# Patient Record
Sex: FEMALE | Race: WHITE | NOT HISPANIC OR LATINO | Employment: UNEMPLOYED | URBAN - METROPOLITAN AREA
[De-identification: names, ages, dates, MRNs, and addresses within clinical notes are randomized per-mention and may not be internally consistent; named-entity substitution may affect disease eponyms.]

---

## 2021-07-14 ENCOUNTER — TELEPHONE (OUTPATIENT)
Dept: SCHEDULING | Facility: IMAGING CENTER | Age: 36
End: 2021-07-14

## 2021-07-15 ENCOUNTER — TELEPHONE (OUTPATIENT)
Dept: MEDICAL GROUP | Facility: MEDICAL CENTER | Age: 36
End: 2021-07-15

## 2021-07-15 ENCOUNTER — OFFICE VISIT (OUTPATIENT)
Dept: MEDICAL GROUP | Facility: MEDICAL CENTER | Age: 36
End: 2021-07-15
Attending: FAMILY MEDICINE
Payer: MEDICAID

## 2021-07-15 VITALS
DIASTOLIC BLOOD PRESSURE: 78 MMHG | BODY MASS INDEX: 47.09 KG/M2 | HEIGHT: 66 IN | WEIGHT: 293 LBS | SYSTOLIC BLOOD PRESSURE: 122 MMHG | OXYGEN SATURATION: 97 % | HEART RATE: 83 BPM | TEMPERATURE: 99 F | RESPIRATION RATE: 22 BRPM

## 2021-07-15 DIAGNOSIS — I89.0 LYMPHEDEMA: ICD-10-CM

## 2021-07-15 DIAGNOSIS — D64.9 ANEMIA, UNSPECIFIED TYPE: ICD-10-CM

## 2021-07-15 DIAGNOSIS — J45.909 UNCOMPLICATED ASTHMA, UNSPECIFIED ASTHMA SEVERITY, UNSPECIFIED WHETHER PERSISTENT: ICD-10-CM

## 2021-07-15 DIAGNOSIS — I27.82 CHRONIC PULMONARY EMBOLISM, UNSPECIFIED PULMONARY EMBOLISM TYPE, UNSPECIFIED WHETHER ACUTE COR PULMONALE PRESENT (HCC): ICD-10-CM

## 2021-07-15 DIAGNOSIS — E04.1 THYROID NODULE: ICD-10-CM

## 2021-07-15 PROBLEM — G47.39 OTHER SLEEP APNEA: Status: ACTIVE | Noted: 2021-07-15

## 2021-07-15 PROBLEM — F43.10 PTSD (POST-TRAUMATIC STRESS DISORDER): Status: ACTIVE | Noted: 2021-07-15

## 2021-07-15 PROBLEM — K21.9 GASTRO-ESOPHAGEAL REFLUX DISEASE WITHOUT ESOPHAGITIS: Status: ACTIVE | Noted: 2021-02-11

## 2021-07-15 PROBLEM — M89.9 BONE DISORDER: Status: ACTIVE | Noted: 2021-07-15

## 2021-07-15 PROBLEM — K76.0 NAFLD (NONALCOHOLIC FATTY LIVER DISEASE): Status: ACTIVE | Noted: 2021-07-15

## 2021-07-15 PROBLEM — E78.1 HYPERTRIGLYCERIDEMIA: Status: ACTIVE | Noted: 2021-04-20

## 2021-07-15 PROBLEM — G43.909 MIGRAINE: Status: ACTIVE | Noted: 2021-07-15

## 2021-07-15 PROBLEM — M48.00 SPINAL STENOSIS: Status: ACTIVE | Noted: 2021-07-15

## 2021-07-15 PROBLEM — F41.1 GAD (GENERALIZED ANXIETY DISORDER): Status: ACTIVE | Noted: 2021-07-15

## 2021-07-15 PROBLEM — N85.00 ENDOMETRIAL HYPERPLASIA: Status: ACTIVE | Noted: 2021-07-15

## 2021-07-15 PROCEDURE — 99213 OFFICE O/P EST LOW 20 MIN: CPT | Performed by: FAMILY MEDICINE

## 2021-07-15 PROCEDURE — 99204 OFFICE O/P NEW MOD 45 MIN: CPT | Performed by: FAMILY MEDICINE

## 2021-07-15 RX ORDER — ALBUTEROL SULFATE 90 UG/1
2 AEROSOL, METERED RESPIRATORY (INHALATION) EVERY 6 HOURS PRN
Qty: 18 G | Refills: 5 | Status: SHIPPED | OUTPATIENT
Start: 2021-07-15 | End: 2021-07-16

## 2021-07-15 RX ORDER — DABIGATRAN ETEXILATE 150 MG/1
CAPSULE ORAL
COMMUNITY
Start: 2021-02-11 | End: 2021-07-15 | Stop reason: SDUPTHER

## 2021-07-15 RX ORDER — MONTELUKAST SODIUM 10 MG/1
TABLET ORAL
COMMUNITY
End: 2023-01-05 | Stop reason: SDUPTHER

## 2021-07-15 RX ORDER — ATORVASTATIN CALCIUM 20 MG/1
20 TABLET, FILM COATED ORAL
COMMUNITY
Start: 2021-04-20 | End: 2021-07-15

## 2021-07-15 RX ORDER — EPINEPHRINE 0.3 MG/.3ML
INJECTION SUBCUTANEOUS
COMMUNITY

## 2021-07-15 RX ORDER — DABIGATRAN ETEXILATE 150 MG/1
150 CAPSULE ORAL 2 TIMES DAILY
Qty: 60 CAPSULE | Refills: 3 | Status: SHIPPED | OUTPATIENT
Start: 2021-07-15 | End: 2023-01-05

## 2021-07-15 RX ORDER — TOPIRAMATE 25 MG/1
TABLET ORAL
COMMUNITY
End: 2023-01-05 | Stop reason: SDUPTHER

## 2021-07-15 RX ORDER — TRIAMCINOLONE ACETONIDE 1 MG/G
CREAM TOPICAL
COMMUNITY
End: 2023-01-05

## 2021-07-15 NOTE — LETTER
[facility]  Susanna Lowery M.D.  [address]  [place] [address]  Fax: [phone]   Authorization for Release/Disclosure of   Protected Health Information   Name: EFE OLIVA : 1985 SSN: [id number]   Address: [address] [place] [address] [address]  [place] [address] Phone:    [phone] (home)    I authorize the entity listed below to release/disclose the PHI below to:   [facility]/Susanna Lowery M.D. and Susanna Lowery M.D.   Provider or Entity Name:  [facility]   Address   [facility], [facility], [place], [place] Phone:      Fax:     Reason for request: continuity of care   Information to be released:    [  ] LAST COLONOSCOPY,  including any PATH REPORT and follow-up  [  ] LAST FIT/COLOGUARD RESULT [  ] LAST DEXA  [  ] LAST MAMMOGRAM  [  ] LAST PAP  [  ] LAST LABS [  ] RETINA EXAM REPORT  [  ] IMMUNIZATION RECORDS  [ X ] Release all info      [  ] Check here and initial the line next to each item to release ALL health information INCLUDING  _____ Care and treatment for drug and / or alcohol abuse  _____ HIV testing, infection status, or AIDS  _____ Genetic Testing    DATES OF SERVICE OR TIME PERIOD TO BE DISCLOSED: _____________  I understand and acknowledge that:  * This Authorization may be revoked at any time by you in writing, except if your health information has already been used or disclosed.  * Your health information that will be used or disclosed as a result of you signing this authorization could be re-disclosed by the recipient. If this occurs, your re-disclosed health information may no longer be protected by State or Federal laws.  * You may refuse to sign this Authorization. Your refusal will not affect your ability to obtain treatment.  * This Authorization becomes effective upon signing and will  on (date) __________.      If no date is indicated, this Authorization will  one (1) year from the signature date.    Name: Efe Oliva    Signature:   Date:     7/15/2021         PLEASE FAX REQUESTED RECORDS BACK TO: [phone]

## 2021-07-15 NOTE — ASSESSMENT & PLAN NOTE
Chronic chest pain that patient states is due to chronic PE. She's been on AC since 2017, previously on warfarin, was placed on pradaxa due to covid so that she didn't have to keep going in to clinic. Not sure about recent imaging. All of her workup has been done in [place]. Not sure about clotting disorders

## 2021-07-15 NOTE — LETTER
[facility]  Susanna Lowery M.D.  [address]  [place] [address]  Fax: [phone]   Authorization for Release/Disclosure of   Protected Health Information   Name: EFE OLIVA : 1985 SSN: [id number]   Address: [address] [place] [address] [address]  [place] [address] Phone:    [phone] (home)    I authorize the entity listed below to release/disclose the PHI below to:   [facility]/Susanna Lowery M.D. and Susanna Lowery M.D.   Provider or Entity Name:  [facility]   Address   [facility], [facility], [place], [place] Phone:      Fax:     Reason for request: continuity of care   Information to be released:    [  ] LAST COLONOSCOPY,  including any PATH REPORT and follow-up  [  ] LAST FIT/COLOGUARD RESULT [  ] LAST DEXA  [  ] LAST MAMMOGRAM  [  ] LAST PAP  [  ] LAST LABS [  ] RETINA EXAM REPORT  [  ] IMMUNIZATION RECORDS  [ X ] Release all info      [  ] Check here and initial the line next to each item to release ALL health information INCLUDING  _____ Care and treatment for drug and / or alcohol abuse  _____ HIV testing, infection status, or AIDS  _____ Genetic Testing    DATES OF SERVICE OR TIME PERIOD TO BE DISCLOSED: _____________  I understand and acknowledge that:  * This Authorization may be revoked at any time by you in writing, except if your health information has already been used or disclosed.  * Your health information that will be used or disclosed as a result of you signing this authorization could be re-disclosed by the recipient. If this occurs, your re-disclosed health information may no longer be protected by State or Federal laws.  * You may refuse to sign this Authorization. Your refusal will not affect your ability to obtain treatment.  * This Authorization becomes effective upon signing and will  on (date) __________.      If no date is indicated, this Authorization will  one (1) year from the signature date.    Name: Efe Oliva    Signature:   Date:     7/15/2021            PLEASE FAX REQUESTED RECORDS BACK TO: [phone]

## 2021-07-15 NOTE — LETTER
[facility]  Susanna Lowery M.D.  [address]  [place] [address]  Fax: [phone]   Authorization for Release/Disclosure of   Protected Health Information   Name: TARI MCGINNIS : 1985 SSN: [id number]   Address: [address] [place] [address] [address]  [place] [address] Phone:    [phone] (home)    I authorize the entity listed below to release/disclose the PHI below to:   [facility]/Susanna Lowery M.D. and Susanna Lowery M.D.   Provider or Entity Name:  Dr. Zach Montes - [facility]   Address   City, State, [place], [place] Phone:      Fax:     Reason for request: continuity of care   Information to be released:    [  ] LAST COLONOSCOPY,  including any PATH REPORT and follow-up  [  ] LAST FIT/COLOGUARD RESULT [  ] LAST DEXA  [  ] LAST MAMMOGRAM  [  ] LAST PAP  [  ] LAST LABS [  ] RETINA EXAM REPORT  [  ] IMMUNIZATION RECORDS  [ X ] Release all info      [  ] Check here and initial the line next to each item to release ALL health information INCLUDING  _____ Care and treatment for drug and / or alcohol abuse  _____ HIV testing, infection status, or AIDS  _____ Genetic Testing    DATES OF SERVICE OR TIME PERIOD TO BE DISCLOSED: _____________  I understand and acknowledge that:  * This Authorization may be revoked at any time by you in writing, except if your health information has already been used or disclosed.  * Your health information that will be used or disclosed as a result of you signing this authorization could be re-disclosed by the recipient. If this occurs, your re-disclosed health information may no longer be protected by State or Federal laws.  * You may refuse to sign this Authorization. Your refusal will not affect your ability to obtain treatment.  * This Authorization becomes effective upon signing and will  on (date) __________.      If no date is indicated, this Authorization will  one (1) year from the signature date.    Name: Tari Mcginnis    Signature:    Date:     7/15/2021       PLEASE FAX REQUESTED RECORDS BACK TO: [phone]

## 2021-07-15 NOTE — PROGRESS NOTES
"Subjective:     CC:    Chief Complaint   Patient presents with   • Establish Care     med refills, note to have therapy animal       HISTORY OF THE PRESENT ILLNESS: Patient is a 35 y.o. female. This pleasant patient is here today to establish care and discuss the following issues.   His/her prior PCP was in [place] and in [facility]. [place], was seeing Dr. Montes - Family medicine     Specialists  - Endocrinologist - weight loss - [facility]  - Hematology - PEs  - ENT - vocal cord dysunfction  - Sleep/pulm   - Ob/GYN - endometrial hyperplasia        Other sleep apnea  Pt states she couldn't tolerate CPAP  Last sleep study > 5-6 years ago    BMI 70 and over, adult (HCC)  Pt states that she is seeing nutrition, states that she has lot some weight already     Asthma  Albuterol use - maybe about once a month     Chronic pulmonary embolism (HCC)  Chronic chest pain that patient states is due to chronic PE. She's been on AC since 2017, previously on warfarin, was placed on pradaxa due to covid so that she didn't have to keep going in to clinic. Not sure about recent imaging. All of her workup has been done in [place]. Not sure about clotting disorders    Hypertriglyceridemia  States that \"my cholesterol is always zero\" but she's not sure about triglycerides.       Allergies: Bee, Metformin, Pcn [penicillins], and Tree nuts food allergy    Current Outpatient Medications Ordered in Epic   Medication Sig Dispense Refill   • dabigatran (PRADAXA) 150 MG Cap capsule Take 1 capsule by mouth 2 times a day. 60 capsule 3   • albuterol (VENTOLIN HFA) 108 (90 Base) MCG/ACT Aero Soln inhalation aerosol Inhale 2 Puffs every 6 hours as needed for Shortness of Breath. 18 g 5   • topiramate (TOPAMAX) 25 MG Tab TOPIRAMATE 25 MG TABS     • Omeprazole 20 MG Tablet Delayed Release Dispersible OMEPRAZOLE 20 MG TBDD     • EPINEPHrine (EPIPEN 2-FABIO) 0.3 MG/0.3ML Solution Auto-injector solution for " "injection EPIPEN 2-FABIO 0.3 MG/0.3ML SOAJ     • montelukast (SINGULAIR) 10 MG Tab MONTELUKAST SODIUM 10 MG TABS     • triamcinolone acetonide (KENALOG) 0.1 % Cream TRIAMCINOLONE ACETONIDE 0.1 % CREA       No current Epic-ordered facility-administered medications on file.       History reviewed. No pertinent past medical history.    History reviewed. No pertinent surgical history.    Social History     Tobacco Use   • Smoking status: Never Smoker   Vaping Use   • Vaping Use: Never used   Substance Use Topics   • Alcohol use: Not Currently     Comment: very rare   • Drug use: Yes     Types: Marijuana     Comment: edibles for pain       Social History     Social History Narrative   • Not on file       History reviewed. No pertinent family history.    ROS:   Gen: no fevers/chills  Pulm: chronic sob, hx asthma  CV: chronic chest pain  GI: reported hx of IBS      Objective:     Exam: /78 (BP Location: Left arm, Patient Position: Sitting, BP Cuff Size: Adult long)   Pulse 83   Temp 37.2 °C (99 °F) (Temporal)   Resp (!) 22   Ht 1.676 m (5' 6\")   Wt (!) 209 kg (461 lb 11.2 oz)   SpO2 97%  Body mass index is 74.52 kg/m².    General: Normal appearing. No distress.  Head: normocephalic  Neck: Supple. Thyroid is not enlarged.  Pulmonary: Clear to ausculation. Decreased sounds due to body habitys. Normal effort. No rales, ronchi, or wheezing.  Cardiovascular: decreased sounds due to body habitus. Regular rate and rhythm without murmur.   Abdomen: Soft, nontender, nondistended. Obese abdomen  Neurologic: no facial droop, gait normal  Lymph: No cervical or supraclavicular lymph nodes are palpable  Skin: Warm and dry.  No obvious lesions.  Musculoskeletal: Uses scooter for mobility. No extremity cyanosis, clubbing, or edema.  Psych: Normal mood and affect. Alert and oriented x3. Judgment and insight is normal.       Labs:   none    Assessment & Plan:   35 y.o. female with the following -    1. Uncomplicated asthma, " "unspecified asthma severity, unspecified whether persistent  - albuterol (VENTOLIN HFA) 108 (90 Base) MCG/ACT Aero Soln inhalation aerosol; Inhale 2 Puffs every 6 hours as needed for Shortness of Breath.  Dispense: 18 g; Refill: 5  Intermittent use of albuterol - will provide rx and pt to let me know how often she is using it.     2. Chronic pulmonary embolism, unspecified pulmonary embolism type, unspecified whether acute cor pulmonale present (HCC)  - dabigatran (PRADAXA) 150 MG Cap capsule; Take 1 capsule by mouth 2 times a day.  Dispense: 60 capsule; Refill: 3  - REFERRAL TO VASCULAR MEDICINE  Records requested on history of chronic PE. Pradaxa is reordered, may need to switch based on formulary preference. Sending to vascular medicine for management and to determine if she still needs to be on this. Not sure if she is meant to be on this indefinitely, pt is not sure of hypercoag workup in the past.    3. Lymphedema  - REFERRAL TO LYMPHEDEMA-PHYSICAL THERAPY  - Comp Metabolic Panel; Future    4. BMI 70 and over, adult (HCC)  - HEMOGLOBIN A1C; Future  - Lipid Profile; Future  Pt reports she is seeing nutrition at [facility] and has been able to lose about 30 lbs recently    5. Anemia, unspecified type  - CBC WITHOUT DIFFERENTIAL; Future    6. Thyroid nodule  - TSH WITH REFLEX TO FT4; Future  Pt reports \"there's probably about 10 nodules\"      Return in about 1 month (around 8/15/2021) for f/u chronic issues .    Please note that this dictation was created using voice recognition software. I have made every reasonable attempt to correct obvious errors, but I expect that there are errors of grammar and possibly content that I did not discover before finalizing the note.  "

## 2021-07-15 NOTE — LETTER
[facility]  Susanna Lowery M.D.  [address]  [place] [address]  Fax: [phone]   Authorization for Release/Disclosure of   Protected Health Information   Name: EFE OLIVA : 1985 SSN: [id number]   Address: [address] [place] [address] [address]  [place] [address] Phone:    [phone] (home)    I authorize the entity listed below to release/disclose the PHI below to:   [facility]/Susanna Lowery M.D. and Susanna Lowery M.D.   Provider or Entity Name:  [facility]   Address   City, [facility], [place], [place] Phone:      Fax:     Reason for request: continuity of care   Information to be released:    [  ] LAST COLONOSCOPY,  including any PATH REPORT and follow-up  [  ] LAST FIT/COLOGUARD RESULT [  ] LAST DEXA  [  ] LAST MAMMOGRAM  [  ] LAST PAP  [  ] LAST LABS [  ] RETINA EXAM REPORT  [  ] IMMUNIZATION RECORDS  [ X ] Release all info      [  ] Check here and initial the line next to each item to release ALL health information INCLUDING  _____ Care and treatment for drug and / or alcohol abuse  _____ HIV testing, infection status, or AIDS  _____ Genetic Testing    DATES OF SERVICE OR TIME PERIOD TO BE DISCLOSED: _____________  I understand and acknowledge that:  * This Authorization may be revoked at any time by you in writing, except if your health information has already been used or disclosed.  * Your health information that will be used or disclosed as a result of you signing this authorization could be re-disclosed by the recipient. If this occurs, your re-disclosed health information may no longer be protected by State or Federal laws.  * You may refuse to sign this Authorization. Your refusal will not affect your ability to obtain treatment.  * This Authorization becomes effective upon signing and will  on (date) __________.      If no date is indicated, this Authorization will  one (1) year from the signature date.    Name: Efe Oliva    Signature:   Date:     7/15/2021       PLEASE  FAX REQUESTED RECORDS BACK TO: [phone]

## 2021-07-15 NOTE — LETTER
July 15, 2021    To whom it may concern,    Tari Oliva is under my medical care. She has certain diagnoses for which having an emotional support animal is beneficial. I support the use of one for Ms. Oliva.    Sincerely,                  Susanna Lowery M.D.

## 2021-07-15 NOTE — LETTER
[facility]  Susanna Lowery M.D.  [address]  [place] [address]  Fax: [phone]   Authorization for Release/Disclosure of   Protected Health Information   Name: EFE OLIVA : 1985 SSN: [id number]   Address: [address] [place] [address] [address]  [place] [address] Phone:    [phone] (home)    I authorize the entity listed below to release/disclose the PHI below to:   [facility]/Susanna Lowery M.D. and Susanna Lowery M.D.   Provider or Entity Name:  [facility]   Address   City, [facility], [place], [place] Phone:      Fax:     Reason for request: continuity of care   Information to be released:    [  ] LAST COLONOSCOPY,  including any PATH REPORT and follow-up  [  ] LAST FIT/COLOGUARD RESULT [  ] LAST DEXA  [  ] LAST MAMMOGRAM  [  ] LAST PAP  [  ] LAST LABS [  ] RETINA EXAM REPORT  [  ] IMMUNIZATION RECORDS  [ X ] Release all info      [  ] Check here and initial the line next to each item to release ALL health information INCLUDING  _____ Care and treatment for drug and / or alcohol abuse  _____ HIV testing, infection status, or AIDS  _____ Genetic Testing    DATES OF SERVICE OR TIME PERIOD TO BE DISCLOSED: _____________  I understand and acknowledge that:  * This Authorization may be revoked at any time by you in writing, except if your health information has already been used or disclosed.  * Your health information that will be used or disclosed as a result of you signing this authorization could be re-disclosed by the recipient. If this occurs, your re-disclosed health information may no longer be protected by State or Federal laws.  * You may refuse to sign this Authorization. Your refusal will not affect your ability to obtain treatment.  * This Authorization becomes effective upon signing and will  on (date) __________.      If no date is indicated, this Authorization will  one (1) year from the signature date.    Name: Efe Oliva    Signature:   Date:     7/15/2021            PLEASE FAX REQUESTED RECORDS BACK TO: [phone]

## 2021-07-16 ENCOUNTER — TELEPHONE (OUTPATIENT)
Dept: MEDICAL GROUP | Facility: MEDICAL CENTER | Age: 36
End: 2021-07-16

## 2021-07-16 RX ORDER — ALBUTEROL SULFATE 90 UG/1
2 AEROSOL, METERED RESPIRATORY (INHALATION) EVERY 6 HOURS PRN
Qty: 18 G | Refills: 5 | Status: SHIPPED | OUTPATIENT
Start: 2021-07-16 | End: 2023-01-05 | Stop reason: SDUPTHER

## 2021-07-16 NOTE — TELEPHONE ENCOUNTER
DOCUMENTATION OF PAR STATUS:    1. Name of Medication & Dose: Albuterol HFA     2. Name of Prescription Coverage Company & phone #: Medicaid FFS    3. Date Prior Auth Submitted: 7/15/2021    4. What information was given to obtain insurance decision? ICD and notes    5. Prior Auth Status? Pending    6. Patient Notified: no

## 2021-07-16 NOTE — TELEPHONE ENCOUNTER
FINAL PRIOR AUTHORIZATION STATUS:    1.  Name of Medication & Dose: Albuterol HFA     2. Prior Auth Status: Denied.  Reason: Medical necessity    3. Action Taken: Pharmacy Notified: no Patient Notified: no    New medication ordered by Dr. Lowery

## 2021-07-22 ENCOUNTER — TELEPHONE (OUTPATIENT)
Dept: MEDICAL GROUP | Facility: MEDICAL CENTER | Age: 36
End: 2021-07-22

## 2021-07-22 NOTE — TELEPHONE ENCOUNTER
VOICEMAIL  1. Caller Name: Tari Oliva                        Call Back Number: [phone] (home)       2. Message: Needs a PA done on her blood thinner medication. She is out and has been on it since 2013.     3. Patient approves office to leave a detailed voicemail/DigiwinSofthart message: no    FINAL PRIOR AUTHORIZATION STATUS:    1.  Name of Medication & Dose: Pradaxa 150 mg Caps     2. Prior Auth Status: Approved through No date given     3. Action Taken: Pharmacy Notified: yes Patient Notified: yes    Phone Number Called: [phone] (home)     Call outcome: Spoke to patient regarding message below.    Message: Informed patient her medication has been approved, I spoke with [facility] and it did go through since the PA was done, but they have to order the medication. I informed the patient to reach out to her pharmacy for when the medication will be available for .

## 2021-08-30 ENCOUNTER — DOCUMENTATION (OUTPATIENT)
Dept: VASCULAR LAB | Facility: MEDICAL CENTER | Age: 36
End: 2021-08-30

## 2021-08-30 ENCOUNTER — APPOINTMENT (OUTPATIENT)
Dept: VASCULAR LAB | Facility: MEDICAL CENTER | Age: 36
End: 2021-08-30
Attending: FAMILY MEDICINE
Payer: MEDICAID

## 2021-08-30 DIAGNOSIS — Z79.01 CHRONIC ANTICOAGULATION: ICD-10-CM

## 2021-08-30 DIAGNOSIS — Z86.711 HISTORY OF PULMONARY EMBOLUS (PE): ICD-10-CM

## 2021-08-30 DIAGNOSIS — I27.82 CHRONIC PULMONARY EMBOLISM, UNSPECIFIED PULMONARY EMBOLISM TYPE, UNSPECIFIED WHETHER ACUTE COR PULMONALE PRESENT (HCC): ICD-10-CM

## 2021-08-30 DIAGNOSIS — G47.39 OTHER SLEEP APNEA: ICD-10-CM

## 2021-08-30 DIAGNOSIS — G43.909 MIGRAINE WITHOUT STATUS MIGRAINOSUS, NOT INTRACTABLE, UNSPECIFIED MIGRAINE TYPE: ICD-10-CM

## 2021-08-30 DIAGNOSIS — K76.0 NAFLD (NONALCOHOLIC FATTY LIVER DISEASE): ICD-10-CM

## 2021-08-30 NOTE — PROGRESS NOTES
Patient has moved to [place] and reports having had called to cancel today's new patient appt at 11a via zoom.  She has not current concerns and planning to establish with local medical team for ongoing care.

## 2022-10-13 ENCOUNTER — APPOINTMENT (OUTPATIENT)
Dept: BEHAVIORAL HEALTH | Facility: CLINIC | Age: 37
End: 2022-10-13

## 2023-01-04 ENCOUNTER — TELEPHONE (OUTPATIENT)
Dept: SCHEDULING | Facility: IMAGING CENTER | Age: 38
End: 2023-01-04

## 2023-01-05 ENCOUNTER — OFFICE VISIT (OUTPATIENT)
Dept: MEDICAL GROUP | Facility: MEDICAL CENTER | Age: 38
End: 2023-01-05

## 2023-01-05 ENCOUNTER — TELEPHONE (OUTPATIENT)
Dept: VASCULAR LAB | Facility: MEDICAL CENTER | Age: 38
End: 2023-01-05

## 2023-01-05 VITALS
HEART RATE: 127 BPM | WEIGHT: 293 LBS | OXYGEN SATURATION: 97 % | HEIGHT: 67 IN | SYSTOLIC BLOOD PRESSURE: 110 MMHG | BODY MASS INDEX: 45.99 KG/M2 | DIASTOLIC BLOOD PRESSURE: 70 MMHG | TEMPERATURE: 98.3 F

## 2023-01-05 DIAGNOSIS — J38.3 VOCAL CORD DYSFUNCTION: ICD-10-CM

## 2023-01-05 DIAGNOSIS — G43.109 MIGRAINE WITH AURA AND WITHOUT STATUS MIGRAINOSUS, NOT INTRACTABLE: ICD-10-CM

## 2023-01-05 DIAGNOSIS — E04.2 NON-TOXIC MULTINODULAR GOITER: ICD-10-CM

## 2023-01-05 DIAGNOSIS — M89.9 BONE DISORDER: ICD-10-CM

## 2023-01-05 DIAGNOSIS — N85.00 ENDOMETRIAL HYPERPLASIA: ICD-10-CM

## 2023-01-05 DIAGNOSIS — K80.20 CALCULUS OF GALLBLADDER WITHOUT CHOLECYSTITIS WITHOUT OBSTRUCTION: ICD-10-CM

## 2023-01-05 DIAGNOSIS — I27.82 CHRONIC PULMONARY EMBOLISM, UNSPECIFIED PULMONARY EMBOLISM TYPE, UNSPECIFIED WHETHER ACUTE COR PULMONALE PRESENT (HCC): ICD-10-CM

## 2023-01-05 DIAGNOSIS — K76.0 NONALCOHOLIC FATTY LIVER DISEASE: ICD-10-CM

## 2023-01-05 DIAGNOSIS — G47.33 OBSTRUCTIVE SLEEP APNEA SYNDROME IN ADULT: ICD-10-CM

## 2023-01-05 DIAGNOSIS — R73.03 PREDIABETES: ICD-10-CM

## 2023-01-05 DIAGNOSIS — E04.1 THYROID NODULE: ICD-10-CM

## 2023-01-05 DIAGNOSIS — J45.40 MODERATE PERSISTENT ASTHMA WITHOUT COMPLICATION: ICD-10-CM

## 2023-01-05 DIAGNOSIS — D50.9 IRON DEFICIENCY ANEMIA, UNSPECIFIED IRON DEFICIENCY ANEMIA TYPE: ICD-10-CM

## 2023-01-05 DIAGNOSIS — K21.9 GASTROESOPHAGEAL REFLUX DISEASE, UNSPECIFIED WHETHER ESOPHAGITIS PRESENT: ICD-10-CM

## 2023-01-05 DIAGNOSIS — F43.10 POSTTRAUMATIC STRESS DISORDER: ICD-10-CM

## 2023-01-05 DIAGNOSIS — F44.81 DISSOCIATIVE IDENTITY DISORDER (HCC): ICD-10-CM

## 2023-01-05 DIAGNOSIS — F41.1 GENERALIZED ANXIETY DISORDER: ICD-10-CM

## 2023-01-05 DIAGNOSIS — Z79.01 CHRONIC ANTICOAGULATION: ICD-10-CM

## 2023-01-05 PROBLEM — K58.9 IRRITABLE BOWEL SYNDROME: Status: ACTIVE | Noted: 2022-01-27

## 2023-01-05 PROBLEM — G47.10 HYPERSOMNIA: Status: ACTIVE | Noted: 2022-02-16

## 2023-01-05 PROBLEM — Z86.711 HX OF PULMONARY EMBOLUS: Status: RESOLVED | Noted: 2021-08-30 | Resolved: 2023-01-05

## 2023-01-05 PROBLEM — G47.39 OTHER SLEEP APNEA: Status: RESOLVED | Noted: 2021-07-15 | Resolved: 2023-01-05

## 2023-01-05 PROCEDURE — 99204 OFFICE O/P NEW MOD 45 MIN: CPT | Performed by: STUDENT IN AN ORGANIZED HEALTH CARE EDUCATION/TRAINING PROGRAM

## 2023-01-05 RX ORDER — TOPIRAMATE 25 MG/1
25 TABLET ORAL 2 TIMES DAILY
Qty: 180 TABLET | Refills: 1 | Status: SHIPPED | OUTPATIENT
Start: 2023-01-05

## 2023-01-05 RX ORDER — CYCLOBENZAPRINE HCL 10 MG
10 TABLET ORAL 2 TIMES DAILY PRN
COMMUNITY
End: 2023-01-05 | Stop reason: SDUPTHER

## 2023-01-05 RX ORDER — MONTELUKAST SODIUM 10 MG/1
TABLET ORAL
Qty: 90 TABLET | Refills: 1 | Status: SHIPPED | OUTPATIENT
Start: 2023-01-05

## 2023-01-05 RX ORDER — GABAPENTIN 100 MG/1
CAPSULE ORAL
COMMUNITY
Start: 2022-07-12 | End: 2023-01-05 | Stop reason: SDUPTHER

## 2023-01-05 RX ORDER — ALBUTEROL SULFATE 90 UG/1
2 AEROSOL, METERED RESPIRATORY (INHALATION) EVERY 6 HOURS PRN
Qty: 18 G | Refills: 5 | Status: SHIPPED | OUTPATIENT
Start: 2023-01-05

## 2023-01-05 RX ORDER — WARFARIN SODIUM 5 MG/1
TABLET ORAL
Qty: 315 TABLET | Refills: 0 | Status: SHIPPED | OUTPATIENT
Start: 2023-01-05

## 2023-01-05 RX ORDER — ALBUTEROL SULFATE 2.5 MG/3ML
2.5 SOLUTION RESPIRATORY (INHALATION) EVERY 4 HOURS PRN
Qty: 30 EACH | Refills: 3 | Status: SHIPPED | OUTPATIENT
Start: 2023-01-05

## 2023-01-05 RX ORDER — GABAPENTIN 100 MG/1
CAPSULE ORAL
Qty: 90 CAPSULE | Refills: 1 | Status: SHIPPED | OUTPATIENT
Start: 2023-01-05

## 2023-01-05 RX ORDER — CYCLOBENZAPRINE HCL 10 MG
10 TABLET ORAL 2 TIMES DAILY PRN
Qty: 90 TABLET | Refills: 1 | Status: SHIPPED | OUTPATIENT
Start: 2023-01-05

## 2023-01-05 RX ORDER — WARFARIN SODIUM 5 MG/1
5 TABLET ORAL DAILY
COMMUNITY
End: 2023-01-05 | Stop reason: SDUPTHER

## 2023-01-05 ASSESSMENT — PATIENT HEALTH QUESTIONNAIRE - PHQ9
8. MOVING OR SPEAKING SO SLOWLY THAT OTHER PEOPLE COULD HAVE NOTICED. OR THE OPPOSITE, BEING SO FIGETY OR RESTLESS THAT YOU HAVE BEEN MOVING AROUND A LOT MORE THAN USUAL: NOT AT ALL
SUM OF ALL RESPONSES TO PHQ9 QUESTIONS 1 AND 2: 0
4. FEELING TIRED OR HAVING LITTLE ENERGY: NOT AT ALL
5. POOR APPETITE OR OVEREATING: NOT AT ALL
2. FEELING DOWN, DEPRESSED, IRRITABLE, OR HOPELESS: NOT AT ALL
6. FEELING BAD ABOUT YOURSELF - OR THAT YOU ARE A FAILURE OR HAVE LET YOURSELF OR YOUR FAMILY DOWN: NOT AL ALL
1. LITTLE INTEREST OR PLEASURE IN DOING THINGS: NOT AT ALL
3. TROUBLE FALLING OR STAYING ASLEEP OR SLEEPING TOO MUCH: NOT AT ALL
7. TROUBLE CONCENTRATING ON THINGS, SUCH AS READING THE NEWSPAPER OR WATCHING TELEVISION: NOT AT ALL
SUM OF ALL RESPONSES TO PHQ QUESTIONS 1-9: 0
9. THOUGHTS THAT YOU WOULD BE BETTER OFF DEAD, OR OF HURTING YOURSELF: NOT AT ALL

## 2023-01-05 ASSESSMENT — ENCOUNTER SYMPTOMS
WEIGHT LOSS: 0
WHEEZING: 0
SHORTNESS OF BREATH: 0
PALPITATIONS: 0
CHILLS: 0
FEVER: 0
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
## 2023-01-06 ENCOUNTER — TELEPHONE (OUTPATIENT)
Dept: MEDICAL GROUP | Facility: MEDICAL CENTER | Age: 38
End: 2023-01-06
Payer: MEDICAID

## 2023-01-06 NOTE — TELEPHONE ENCOUNTER
[facility]    Received anticoag referral for warfarin due to PE from Dr. Winslow on 1/5/23    Called pt to schedule appt to establish care - no answer. LVM.    Insurance: Medicaid  PCP: [facility]  Locations to be seen: [address]    [facility] at [phone], fax [phone]    Abdulkadir Gutiérrez, ChachoD, BCACP

## 2023-01-07 NOTE — TELEPHONE ENCOUNTER
Respiratory Equipment; Nebulizer Machine order, Recent Office Visit Notes, ID/Insurance Cards, and Face Sheet has been sent to the following:    [facility] [address] [place] [address] Ph. [phone] Fax. [phone]    Patient informed? Yes

## 2023-01-12 ENCOUNTER — DOCUMENTATION (OUTPATIENT)
Dept: VASCULAR LAB | Facility: MEDICAL CENTER | Age: 38
End: 2023-01-12
Payer: MEDICAID

## 2023-01-13 ENCOUNTER — TELEPHONE (OUTPATIENT)
Dept: VASCULAR LAB | Facility: MEDICAL CENTER | Age: 38
End: 2023-01-13
Payer: MEDICAID
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
## 2023-01-13 NOTE — TELEPHONE ENCOUNTER
[facility]     Received anticoag referral for warfarin due to PE from Dr. Winslow on 1/5/23     Pt missed initial appt d/t lack of transportation.    Scheduled pt for initial appt on 1/17.     Insurance: Medicaid  [facility]  Locations to be seen: [facility] at [phone], fax [phone]     Abdulkadir Gutiérrez, ChachoD, BCACP

## 2023-01-17 ENCOUNTER — TELEPHONE (OUTPATIENT)
Dept: VASCULAR LAB | Facility: MEDICAL CENTER | Age: 38
End: 2023-01-17
Payer: MEDICAID

## 2023-01-18 ENCOUNTER — APPOINTMENT (OUTPATIENT)
Dept: RADIOLOGY | Facility: MEDICAL CENTER | Age: 38
End: 2023-01-18
Attending: STUDENT IN AN ORGANIZED HEALTH CARE EDUCATION/TRAINING PROGRAM
Payer: MEDICAID

## 2023-01-18 NOTE — TELEPHONE ENCOUNTER
[facility]    Pt has no showed two separate times. Left VM for pt to contact our clinic if they would like to establish care.      Will discharge back to referring physician unless we hear back from the pt or they keep an initial appointment.     Nic Carbajal, PharmD     CC  Dr Winslow

## 2024-01-19 ENCOUNTER — HOSPITAL ENCOUNTER (OUTPATIENT)
Dept: RADIOLOGY | Facility: MEDICAL CENTER | Age: 39
End: 2024-01-19
Attending: STUDENT IN AN ORGANIZED HEALTH CARE EDUCATION/TRAINING PROGRAM
Payer: MEDICAID

## 2024-01-19 DIAGNOSIS — K76.0 FATTY LIVER DISEASE, NONALCOHOLIC: ICD-10-CM
